# Patient Record
Sex: FEMALE | Race: WHITE | NOT HISPANIC OR LATINO | URBAN - METROPOLITAN AREA
[De-identification: names, ages, dates, MRNs, and addresses within clinical notes are randomized per-mention and may not be internally consistent; named-entity substitution may affect disease eponyms.]

---

## 2019-11-21 ENCOUNTER — OFFICE (OUTPATIENT)
Dept: URBAN - METROPOLITAN AREA CLINIC 33 | Facility: CLINIC | Age: 39
End: 2019-11-21

## 2019-11-21 VITALS
SYSTOLIC BLOOD PRESSURE: 126 MMHG | DIASTOLIC BLOOD PRESSURE: 91 MMHG | WEIGHT: 124 LBS | HEIGHT: 65 IN | HEART RATE: 64 BPM | TEMPERATURE: 97.9 F

## 2019-11-21 DIAGNOSIS — Z83.71 FAMILY HISTORY OF COLONIC POLYPS: ICD-10-CM

## 2019-11-21 DIAGNOSIS — K31.84 GASTROPARESIS: ICD-10-CM

## 2019-11-21 DIAGNOSIS — Z80.0 FAMILY HISTORY OF MALIGNANT NEOPLASM OF DIGESTIVE ORGANS: ICD-10-CM

## 2019-11-21 PROCEDURE — 99203 OFFICE O/P NEW LOW 30 MIN: CPT

## 2019-11-21 PROCEDURE — 00031: CPT

## 2019-11-21 NOTE — SERVICEHPINOTES
JOSE WHITAKER   is a   39  female who presents with GI issues. Maternal grandfather had a hx colon cancer in 60's. Mother and father have polyps in 60's. BRMoves bowel daily on BSS type 3. Occasional constipation. Denies blood in stool, melena, diarrhea or weight loss.  Occasional hemorrhoids flare up. BRRLQ pain, on and off over a few months. The pain does not correlate with eating or bowel movements. Hx  x 2. It feels like dull ache. BRDenies nausea, vomiting, dysphagia, dyspepsia or early satiety.  BRSlight acid reflux, takes TUMS. BRHx of gastroparesis at age 16. Seen at [facility]. Symptoms have improved with stress management and after having kids. BRLast EGD was done approx. . Last colonoscopy was done in 's.  BRDenies chest pain, palpitations or sob.